# Patient Record
Sex: FEMALE | Race: WHITE | NOT HISPANIC OR LATINO | Employment: FULL TIME | URBAN - METROPOLITAN AREA
[De-identification: names, ages, dates, MRNs, and addresses within clinical notes are randomized per-mention and may not be internally consistent; named-entity substitution may affect disease eponyms.]

---

## 2022-09-01 ENCOUNTER — APPOINTMENT (OUTPATIENT)
Dept: CT IMAGING | Facility: HOSPITAL | Age: 52
End: 2022-09-01

## 2022-09-01 ENCOUNTER — HOSPITAL ENCOUNTER (EMERGENCY)
Facility: HOSPITAL | Age: 52
Discharge: HOME | End: 2022-09-01
Attending: EMERGENCY MEDICINE | Admitting: EMERGENCY MEDICINE

## 2022-09-01 VITALS
HEART RATE: 82 BPM | BODY MASS INDEX: 27.18 KG/M2 | RESPIRATION RATE: 12 BRPM | OXYGEN SATURATION: 98 % | TEMPERATURE: 98.5 F | DIASTOLIC BLOOD PRESSURE: 84 MMHG | SYSTOLIC BLOOD PRESSURE: 149 MMHG | WEIGHT: 138.45 LBS | HEIGHT: 60 IN

## 2022-09-01 DIAGNOSIS — R07.81 RIB PAIN ON LEFT SIDE: ICD-10-CM

## 2022-09-01 DIAGNOSIS — S42.002A CLOSED NONDISPLACED FRACTURE OF LEFT CLAVICLE, UNSPECIFIED PART OF CLAVICLE, INITIAL ENCOUNTER: ICD-10-CM

## 2022-09-01 DIAGNOSIS — S09.90XA INJURY OF HEAD, INITIAL ENCOUNTER: Primary | ICD-10-CM

## 2022-09-01 DIAGNOSIS — S22.42XA CLOSED FRACTURE OF MULTIPLE RIBS OF LEFT SIDE, INITIAL ENCOUNTER: ICD-10-CM

## 2022-09-01 DIAGNOSIS — S22.019A CLOSED FRACTURE OF FIRST THORACIC VERTEBRA, UNSPECIFIED FRACTURE MORPHOLOGY, INITIAL ENCOUNTER: ICD-10-CM

## 2022-09-01 LAB
ALBUMIN SERPL-MCNC: 3.9 G/DL (ref 3.5–5.2)
ALBUMIN/GLOB SERPL: 1.4 G/DL
ALP SERPL-CCNC: 155 U/L (ref 39–117)
ALT SERPL W P-5'-P-CCNC: 41 U/L (ref 1–33)
ANION GAP SERPL CALCULATED.3IONS-SCNC: 16 MMOL/L (ref 5–15)
APTT PPP: 27.5 SECONDS (ref 24–31)
AST SERPL-CCNC: 60 U/L (ref 1–32)
BASOPHILS # BLD AUTO: 0.1 10*3/MM3 (ref 0–0.2)
BASOPHILS NFR BLD AUTO: 0.9 % (ref 0–1.5)
BILIRUB SERPL-MCNC: 1.2 MG/DL (ref 0–1.2)
BUN SERPL-MCNC: 11 MG/DL (ref 6–20)
BUN/CREAT SERPL: 20 (ref 7–25)
CALCIUM SPEC-SCNC: 9.4 MG/DL (ref 8.6–10.5)
CHLORIDE SERPL-SCNC: 98 MMOL/L (ref 98–107)
CO2 SERPL-SCNC: 23 MMOL/L (ref 22–29)
CREAT SERPL-MCNC: 0.55 MG/DL (ref 0.57–1)
DEPRECATED RDW RBC AUTO: 45.5 FL (ref 37–54)
EGFRCR SERPLBLD CKD-EPI 2021: 109.8 ML/MIN/1.73
EOSINOPHIL # BLD AUTO: 0 10*3/MM3 (ref 0–0.4)
EOSINOPHIL NFR BLD AUTO: 0.4 % (ref 0.3–6.2)
ERYTHROCYTE [DISTWIDTH] IN BLOOD BY AUTOMATED COUNT: 13.5 % (ref 12.3–15.4)
GLOBULIN UR ELPH-MCNC: 2.7 GM/DL
GLUCOSE SERPL-MCNC: 227 MG/DL (ref 65–99)
HCT VFR BLD AUTO: 50.3 % (ref 34–46.6)
HGB BLD-MCNC: 16.9 G/DL (ref 12–15.9)
INR PPP: 1 (ref 0.93–1.1)
LYMPHOCYTES # BLD AUTO: 1.2 10*3/MM3 (ref 0.7–3.1)
LYMPHOCYTES NFR BLD AUTO: 19.9 % (ref 19.6–45.3)
MCH RBC QN AUTO: 32.4 PG (ref 26.6–33)
MCHC RBC AUTO-ENTMCNC: 33.7 G/DL (ref 31.5–35.7)
MCV RBC AUTO: 96.1 FL (ref 79–97)
MONOCYTES # BLD AUTO: 0.5 10*3/MM3 (ref 0.1–0.9)
MONOCYTES NFR BLD AUTO: 8.3 % (ref 5–12)
NEUTROPHILS NFR BLD AUTO: 4.2 10*3/MM3 (ref 1.7–7)
NEUTROPHILS NFR BLD AUTO: 70.5 % (ref 42.7–76)
NRBC BLD AUTO-RTO: 0.1 /100 WBC (ref 0–0.2)
PLATELET # BLD AUTO: 136 10*3/MM3 (ref 140–450)
PMV BLD AUTO: 8.7 FL (ref 6–12)
POTASSIUM SERPL-SCNC: 4.1 MMOL/L (ref 3.5–5.2)
PROT SERPL-MCNC: 6.6 G/DL (ref 6–8.5)
PROTHROMBIN TIME: 10.3 SECONDS (ref 9.6–11.7)
RBC # BLD AUTO: 5.24 10*6/MM3 (ref 3.77–5.28)
SODIUM SERPL-SCNC: 137 MMOL/L (ref 136–145)
WBC NRBC COR # BLD: 6 10*3/MM3 (ref 3.4–10.8)

## 2022-09-01 PROCEDURE — 70450 CT HEAD/BRAIN W/O DYE: CPT

## 2022-09-01 PROCEDURE — 70486 CT MAXILLOFACIAL W/O DYE: CPT

## 2022-09-01 PROCEDURE — 74177 CT ABD & PELVIS W/CONTRAST: CPT

## 2022-09-01 PROCEDURE — 25010000002 KETOROLAC TROMETHAMINE PER 15 MG: Performed by: PHYSICIAN ASSISTANT

## 2022-09-01 PROCEDURE — 71260 CT THORAX DX C+: CPT

## 2022-09-01 PROCEDURE — 99284 EMERGENCY DEPT VISIT MOD MDM: CPT

## 2022-09-01 PROCEDURE — 85025 COMPLETE CBC W/AUTO DIFF WBC: CPT | Performed by: PHYSICIAN ASSISTANT

## 2022-09-01 PROCEDURE — 85730 THROMBOPLASTIN TIME PARTIAL: CPT | Performed by: PHYSICIAN ASSISTANT

## 2022-09-01 PROCEDURE — 80053 COMPREHEN METABOLIC PANEL: CPT | Performed by: PHYSICIAN ASSISTANT

## 2022-09-01 PROCEDURE — 85610 PROTHROMBIN TIME: CPT | Performed by: PHYSICIAN ASSISTANT

## 2022-09-01 PROCEDURE — 72125 CT NECK SPINE W/O DYE: CPT

## 2022-09-01 PROCEDURE — 0 IOPAMIDOL PER 1 ML: Performed by: EMERGENCY MEDICINE

## 2022-09-01 PROCEDURE — 96374 THER/PROPH/DIAG INJ IV PUSH: CPT

## 2022-09-01 RX ORDER — HYDROCODONE BITARTRATE AND ACETAMINOPHEN 5; 325 MG/1; MG/1
1 TABLET ORAL EVERY 8 HOURS PRN
Qty: 9 TABLET | Refills: 0 | Status: SHIPPED | OUTPATIENT
Start: 2022-09-01

## 2022-09-01 RX ORDER — LIDOCAINE 50 MG/G
1 PATCH TOPICAL ONCE
Status: DISCONTINUED | OUTPATIENT
Start: 2022-09-01 | End: 2022-09-01 | Stop reason: HOSPADM

## 2022-09-01 RX ORDER — LIDOCAINE 50 MG/G
1 PATCH TOPICAL EVERY 24 HOURS
Qty: 30 EACH | Refills: 0 | Status: SHIPPED | OUTPATIENT
Start: 2022-09-01

## 2022-09-01 RX ORDER — KETOROLAC TROMETHAMINE 30 MG/ML
30 INJECTION, SOLUTION INTRAMUSCULAR; INTRAVENOUS ONCE
Status: COMPLETED | OUTPATIENT
Start: 2022-09-01 | End: 2022-09-01

## 2022-09-01 RX ORDER — SODIUM CHLORIDE 0.9 % (FLUSH) 0.9 %
10 SYRINGE (ML) INJECTION AS NEEDED
Status: DISCONTINUED | OUTPATIENT
Start: 2022-09-01 | End: 2022-09-01 | Stop reason: HOSPADM

## 2022-09-01 RX ADMIN — IOPAMIDOL 100 ML: 755 INJECTION, SOLUTION INTRAVENOUS at 12:58

## 2022-09-01 RX ADMIN — LIDOCAINE 1 PATCH: 50 PATCH CUTANEOUS at 11:47

## 2022-09-01 RX ADMIN — KETOROLAC TROMETHAMINE 30 MG: 30 INJECTION, SOLUTION INTRAMUSCULAR at 14:02

## 2022-09-01 NOTE — ED NOTES
Pt from home with c/o left rib, facial and head pain after a fall 2 days ago. Pt is building a new deck and stepped off the side. Pt denies any LOC, drainage from ears or open wounds. Pt reports swelling to left side of face only started this morning.

## 2022-09-01 NOTE — ED PROVIDER NOTES
Subjective     Patient is a 52-year-old female comes in complaining of head injury since Tuesday night, 2 days ago.  Patient states that she suffered a mechanical fall as they are doing construction on the patio around their backyard pool.  Patient states that she fell and hit somewhat in concrete the left side of her head as well as left side of her chest and ribs.  Patient states that she felt fine yesterday with no headache no nausea or vomiting and only slight discomfort in her left ribs.  Patient states that she noticed some swelling in the left side of her face and temporal region as well as some discoloration behind her left ear.  Patient denies any blood from her ears or nose, blurred vision, dizziness, syncopal episode, loss of consciousness or any other acute symptoms at this time.  Patient states that she still has not had a headache since the fall and denies any nausea or vomiting.        Review of Systems   Constitutional: Negative for chills, fatigue and fever.   HENT: Positive for facial swelling. Negative for congestion, ear discharge, ear pain, mouth sores, postnasal drip, sinus pressure, sore throat, tinnitus and trouble swallowing.    Eyes: Negative for photophobia, discharge and visual disturbance.   Respiratory: Negative for cough, chest tightness, shortness of breath and wheezing.    Cardiovascular: Negative for chest pain, palpitations and leg swelling.   Gastrointestinal: Negative for abdominal pain, blood in stool, diarrhea, nausea and vomiting.   Genitourinary: Negative for dysuria, flank pain, frequency, hematuria and urgency.   Musculoskeletal: Negative for arthralgias and myalgias.        Left rib pain.  Left sided facial swelling, temporal area   Skin: Negative for rash.   Neurological: Negative for dizziness, syncope, light-headedness and headaches.   Psychiatric/Behavioral: Negative for confusion.       History reviewed. No pertinent past medical history.    Allergies   Allergen  Reactions   • Penicillins Anaphylaxis   • Codeine Other (See Comments)     Syncope.       History reviewed. No pertinent surgical history.    History reviewed. No pertinent family history.    Social History     Socioeconomic History   • Marital status:            Objective   Physical Exam  Vitals and nursing note reviewed.   Constitutional:       General: She is not in acute distress.     Appearance: Normal appearance. She is well-developed. She is not diaphoretic.   HENT:      Head: Normocephalic and atraumatic.      Right Ear: Tympanic membrane, ear canal and external ear normal.      Left Ear: Tympanic membrane, ear canal and external ear normal.      Ears:      Comments: Patient has mild ecchymosis behind the left ear.  No hemotympanum bilaterally.     Nose: Nose normal.      Mouth/Throat:      Pharynx: No oropharyngeal exudate.   Eyes:      Extraocular Movements: Extraocular movements intact.      Conjunctiva/sclera: Conjunctivae normal.      Pupils: Pupils are equal, round, and reactive to light.   Cardiovascular:      Rate and Rhythm: Normal rate and regular rhythm.      Pulses: Normal pulses.      Heart sounds: Normal heart sounds.      Comments: S1, S2 audible.  Pulmonary:      Effort: Pulmonary effort is normal. No respiratory distress.      Breath sounds: Normal breath sounds. No wheezing, rhonchi or rales.      Comments: On room air.  Abdominal:      General: Bowel sounds are normal. There is no distension.      Palpations: Abdomen is soft.      Tenderness: There is no abdominal tenderness. There is no guarding or rebound.   Musculoskeletal:         General: Tenderness present. No deformity. Normal range of motion.      Cervical back: Normal range of motion.      Right lower leg: No edema.      Left lower leg: No edema.      Comments: Patient has no open wounds or ecchymosis or rashes of anterior posterior chest.  Patient has tenderness to palpation and mid axillary line on the left of ribs.   "  Skin:     General: Skin is warm.      Capillary Refill: Capillary refill takes less than 2 seconds.      Findings: No erythema or rash.   Neurological:      General: No focal deficit present.      Mental Status: She is alert and oriented to person, place, and time. Mental status is at baseline.      Cranial Nerves: No cranial nerve deficit.      Sensory: No sensory deficit.      Motor: No weakness.      Comments: Cranial nerves II through XII intact.    Motor: 5+ upper extremity lower extremity bilaterally, flexors and extensors symmetric.    Sensation: Grossly intact to fine touch upper extremity and lower extremity bilaterally symmetric.    Cerebellar: FTN bilaterally.  No tremor noted.    Tone: Normal bulk and tone in upper and lower extremities.  No atrophy noted.     Psychiatric:         Mood and Affect: Mood normal.         Behavior: Behavior normal.         Procedures           ED Course  ED Course as of 09/01/22 2145   Thu Sep 01, 2022   1420 Inspect verified and unremarkable. [RL]      ED Course User Index  [RL] Ronan Jimenez PA      /84   Pulse 82   Temp 98.5 °F (36.9 °C)   Resp 12   Ht 152.4 cm (60\")   Wt 62.8 kg (138 lb 7.2 oz)   SpO2 98%   BMI 27.04 kg/m²   Labs Reviewed   COMPREHENSIVE METABOLIC PANEL - Abnormal; Notable for the following components:       Result Value    Glucose 227 (*)     Creatinine 0.55 (*)     ALT (SGPT) 41 (*)     AST (SGOT) 60 (*)     Alkaline Phosphatase 155 (*)     Anion Gap 16.0 (*)     All other components within normal limits    Narrative:     GFR Normal >60  Chronic Kidney Disease <60  Kidney Failure <15     CBC WITH AUTO DIFFERENTIAL - Abnormal; Notable for the following components:    Hemoglobin 16.9 (*)     Hematocrit 50.3 (*)     Platelets 136 (*)     All other components within normal limits   PROTIME-INR - Normal   APTT - Normal   CBC AND DIFFERENTIAL    Narrative:     The following orders were created for panel order CBC & Differential.  Procedure       "                         Abnormality         Status                     ---------                               -----------         ------                     CBC Auto Differential[[id number]]        Abnormal            Final result                 Please view results for these tests on the individual orders.     CT Head Without Contrast    Result Date: 9/1/2022  1. No intracranial hemorrhage. 2. Large left-sided scalp hematoma. No underlying fracture.  Electronically Signed By-Eric Deshpande MD On:9/1/2022 1:11 PM This report was finalized on [id number] by  Eric Deshpande MD.    CT Chest With Contrast Diagnostic    Result Date: 9/1/2022   1. Left third through fifth rib fractures. The fourth and fifth rib fractures are mildly displaced. No pneumothorax. 2. Left lower lobe bronchus mucus plugging. There is complete atelectasis/collapse of the left lower lobe. 3. There is a nondisplaced fracture of the left clavicular head with overlying chest wall soft tissue contusion. 4. There are no acute findings within the abdomen or pelvis. 5. Additional incidental findings include advanced sigmoid diverticulosis, marked diffuse hepatic steatosis, nonobstructing left renal stones, right renal cysts.    Electronically Signed By-Claritza Monroe MD On:9/1/2022 1:17 PM This report was finalized on [id number] by  Claritza Monroe MD.    CT Cervical Spine Without Contrast    Result Date: 9/1/2022  1. Negative for cervical spine fracture. 2. Fracture at the right T1 transverse process. 3. Fracture at the medial left clavicle extending to the articulation with the manubrium. 4. Cervical degenerative findings above.  Electronically Signed By-Eric Deshpande MD On:9/1/2022 1:21 PM This report was finalized on [id number] by  Eric Deshpande MD.    CT Abdomen Pelvis With Contrast    Result Date: 9/1/2022   1. Left third through fifth rib fractures. The fourth and fifth rib fractures are mildly displaced. No pneumothorax. 2. Left lower lobe  "bronchus mucus plugging. There is complete atelectasis/collapse of the left lower lobe. 3. There is a nondisplaced fracture of the left clavicular head with overlying chest wall soft tissue contusion. 4. There are no acute findings within the abdomen or pelvis. 5. Additional incidental findings include advanced sigmoid diverticulosis, marked diffuse hepatic steatosis, nonobstructing left renal stones, right renal cysts.    Electronically Signed By-Claritza Monroe MD On:9/1/2022 1:17 PM This report was finalized on [id number] by  Claritza Monroe MD.    CT Facial Bones Without Contrast    Result Date: 9/1/2022   1. Marked left side scalp soft tissue swelling and left facial soft tissue swelling. 2. No acute facial fracture. 3. Linear radiopaque densities are seen in the right supraorbital soft tissues. It is unclear if these could represent physiologic calcifications or retained opaque foreign bodies.  Electronically Signed By-Claritza Monroe MD On:9/1/2022 1:21 PM This report was finalized on [id number] by  Claritza Monroe MD.                                         MDM     Chart review: Allergies reviewed  EKG: Not indicated  Imaging: See above    Labs: AST and ALT slightly elevated at 60 and 41 respectively, alk phos elevated 155.  Platelets slightly low at 136 otherwise largely unremarkable CBC and CMP.  Coags unremarkable.  Vitals:  /84   Pulse 82   Temp 98.5 °F (36.9 °C)   Resp 12   Ht 152.4 cm (60\")   Wt 62.8 kg (138 lb 7.2 oz)   SpO2 98%   BMI 27.04 kg/m²     Medications given:    Medications   iopamidol (ISOVUE-370) 76 % injection 100 mL (100 mL Intravenous Given 9/1/22 1258)   ketorolac (TORADOL) injection 30 mg (30 mg Intravenous Given 9/1/22 1402)       Procedures:  Not indicated  MDM: Patient is a 52-year-old female comes in complaining of mechanical fall and head injury.  IV was established.  AST and ALT slightly elevated at 60 and 41 respectively, alk phos elevated 155.  Platelets " slightly low at 136 otherwise largely unremarkable CBC and CMP.  Coags unremarkable.  Patient was given Toradol for pain control and lidocaine patch for local relief.  Inspect verified and unremarkable.  CT head shows large left-sided scalp hematoma.  No underlying fracture.  CT facial bones shows marked left side scalp soft tissue swelling otherwise no acute findings.  Minimal radiopaque densities are seen in the right supraorbital soft tissues.  Patient denies any eye pain or foreign body sensation.  CT cervical spine unremarkable for acute findings of the cervical spine however does show fracture.  The right T1 transverse process.  Fracture at the medial left clavicle extending to the articulation with the manubrium.  CT chest abdomen and pelvis with contrast shows left third through fifth rib fractures.  The fourth and fifth rib fractures are mildly displaced.  No pneumothorax.  Left lower lobe bronchus mucous plugging.  There is complete atelectasis of the left lower lobe.  Patient was given incentive spirometer upon discharge.  Patient was given strict return precautions and voiced understanding.  Patient was fit with a left arm sling giving clavicular fracture.  Patient was given strict head injury precautions and voiced understanding.  Patient instructed to follow-up with neurosurgery regarding T1 transverse fracture.  Patient was sent home with Norco for further pain control in addition to lidocaine patches for further relief at home and inspect was verified and unremarkable.  Patient instructed to follow-up with orthopedic surgery as well regarding clavicular fracture.  I also discussed patient's elevated liver enzymes and elevated blood pressure and to follow-up with these with primary care and voiced understanding.  See full discharge instructions for further details.  Results and plan discussed with patient and is agreeable with plan.    Final diagnoses:   Injury of head, initial encounter   Closed  fracture of first thoracic vertebra, unspecified fracture morphology, initial encounter (HCC)   Closed nondisplaced fracture of left clavicle, unspecified part of clavicle, initial encounter   Rib pain on left side   Closed fracture of multiple ribs of left side, initial encounter       ED Disposition  ED Disposition     ED Disposition   Discharge    Condition   Stable    Comment   --             [facility] EMERGENCY DEPART[facility]  [address] [facility] [id number] [phone]  Go in 1 day  As needed, If symptoms worsen    PATIENT CONNECTION - [facility] [address]  [phone]  Call in 1 week  As needed    Michael Villa IV, MD  [address]  [place] [address]  [phone]    Schedule an appointment as soon as possible for a visit in 1 week  for neurosurgery follow up    Sriram Camarena MD  [facility]  [place] [address]  [phone]    Schedule an appointment as soon as possible for a visit in 3 days  for collar bone fracture         Medication List      New Prescriptions    HYDROcodone-acetaminophen 5-325 MG per tablet  Commonly known as: NORCO  Take 1 tablet by mouth Every 8 (Eight) Hours As Needed for Severe Pain.     lidocaine 5 %  Commonly known as: LIDODERM  Place 1 patch on the skin as directed by provider Daily. Remove & Discard patch within 12 hours or as directed by MD           Where to Get Your Medications      These medications were sent to [id number] - [place], [place] - [address] - [phone] PH - [phone] FX  [address] [place] [address]    Phone: [phone]   · HYDROcodone-acetaminophen 5-325 MG per tablet  · lidocaine 5 %          Ronan Jimenez PA  09/01/22 0633

## 2022-09-01 NOTE — DISCHARGE INSTRUCTIONS
Please follow head injury precautions above.  Please wear sling until follow-up with orthopedic surgery in 3 days.  Please take pain medicine as prescribed as needed.  Can take ibuprofen as needed in addition to your pain medicine.  Your pain medicine, Norco, already has Tylenol in this so please do not take Tylenol in addition to the Norco.  Please use lidocaine patches for local relief, if these are too expensive via prescription can use over-the-counter versions instead.  Please follow-up with neurosurgery, , in 1 week regarding T1 transverse fracture.  Please come back to the ER if you spike a high fever or have a productive cough or are acting confused or vomiting profusely as you will need reevaluation that time.  Please use incentive spirometer blowing into this every 2 hours while awake.  Please follow with your primary care physician in 1 to 2 weeks as well.  Your blood pressure was also elevated here today, please have this rechecked at your primary care office.  Please do not lift over 5 pounds until follow-up with neurosurgery in 1 week.